# Patient Record
Sex: FEMALE | Race: WHITE | NOT HISPANIC OR LATINO | Employment: FULL TIME | URBAN - METROPOLITAN AREA
[De-identification: names, ages, dates, MRNs, and addresses within clinical notes are randomized per-mention and may not be internally consistent; named-entity substitution may affect disease eponyms.]

---

## 2017-01-18 ENCOUNTER — TRANSCRIBE ORDERS (OUTPATIENT)
Dept: ADMINISTRATIVE | Facility: HOSPITAL | Age: 47
End: 2017-01-18

## 2017-01-18 DIAGNOSIS — Z12.31 VISIT FOR SCREENING MAMMOGRAM: Primary | ICD-10-CM

## 2017-01-24 ENCOUNTER — APPOINTMENT (OUTPATIENT)
Dept: MAMMOGRAPHY | Facility: HOSPITAL | Age: 47
End: 2017-01-24
Attending: OBSTETRICS & GYNECOLOGY

## 2017-01-31 ENCOUNTER — HOSPITAL ENCOUNTER (OUTPATIENT)
Dept: MAMMOGRAPHY | Facility: HOSPITAL | Age: 47
Discharge: HOME | End: 2017-01-31
Attending: OBSTETRICS & GYNECOLOGY | Admitting: OBSTETRICS & GYNECOLOGY

## 2017-01-31 DIAGNOSIS — Z12.31 VISIT FOR SCREENING MAMMOGRAM: ICD-10-CM

## 2017-01-31 PROCEDURE — 77063 BREAST TOMOSYNTHESIS BI: CPT | Performed by: RADIOLOGY

## 2017-01-31 PROCEDURE — G0202 SCR MAMMO BI INCL CAD: HCPCS

## 2017-01-31 PROCEDURE — 77067 SCR MAMMO BI INCL CAD: CPT | Performed by: RADIOLOGY

## 2017-01-31 PROCEDURE — 77063 BREAST TOMOSYNTHESIS BI: CPT

## 2019-07-30 ENCOUNTER — TRANSCRIBE ORDERS (OUTPATIENT)
Dept: ADMINISTRATIVE | Facility: HOSPITAL | Age: 49
End: 2019-07-30

## 2019-07-30 DIAGNOSIS — Z12.31 VISIT FOR SCREENING MAMMOGRAM: Primary | ICD-10-CM

## 2019-08-14 ENCOUNTER — HOSPITAL ENCOUNTER (OUTPATIENT)
Dept: MAMMOGRAPHY | Facility: HOSPITAL | Age: 49
Discharge: HOME | End: 2019-08-14
Admitting: OBSTETRICS & GYNECOLOGY

## 2019-08-14 DIAGNOSIS — Z12.31 VISIT FOR SCREENING MAMMOGRAM: ICD-10-CM

## 2019-08-14 PROCEDURE — 77063 BREAST TOMOSYNTHESIS BI: CPT

## 2019-08-14 PROCEDURE — 77067 SCR MAMMO BI INCL CAD: CPT | Performed by: RADIOLOGY

## 2019-08-14 PROCEDURE — 77063 BREAST TOMOSYNTHESIS BI: CPT | Performed by: RADIOLOGY

## 2019-08-14 PROCEDURE — 77067 SCR MAMMO BI INCL CAD: CPT

## 2020-10-13 ENCOUNTER — TRANSCRIBE ORDERS (OUTPATIENT)
Dept: ADMINISTRATIVE | Facility: HOSPITAL | Age: 50
End: 2020-10-13

## 2020-10-13 DIAGNOSIS — Z12.31 VISIT FOR SCREENING MAMMOGRAM: Primary | ICD-10-CM

## 2020-10-14 ENCOUNTER — OFFICE VISIT (OUTPATIENT)
Dept: OBSTETRICS AND GYNECOLOGY | Facility: CLINIC | Age: 50
End: 2020-10-14

## 2020-10-14 VITALS
DIASTOLIC BLOOD PRESSURE: 80 MMHG | SYSTOLIC BLOOD PRESSURE: 120 MMHG | WEIGHT: 235 LBS | BODY MASS INDEX: 43.24 KG/M2 | TEMPERATURE: 97.5 F | HEIGHT: 62 IN

## 2020-10-14 DIAGNOSIS — L98.8 LESION OF SKIN OF BREAST: Primary | ICD-10-CM

## 2020-10-14 PROCEDURE — 99213 OFFICE O/P EST LOW 20 MIN: CPT | Performed by: NURSE PRACTITIONER

## 2020-10-14 NOTE — PROGRESS NOTES
"Subjective   Chief Complaint   Patient presents with   • Breast Problem     Inga Ambrosio is a 49 y.o. year old  presenting to be seen because of she tried to schedule a mammogram and mentioned that she had a lesion on the left breast.  She noticed a tender area under her left breast one week ago.  It was swollen and tender.  She could not see the area as she has large breasts.  Her  squeezed it and a small amount of pus came out.  It it now smaller and nontender.  She denies other breast problems.      · Last mammogram: 2019  · Last breast MRI: she has never had a MRI   ·   The following portions of the patient's history were reviewed and updated as appropriate:current medications, allergies, past family history, past medical history, past social history and past surgical history    Social History    Tobacco Use      Smoking status: Never Smoker    Review of Systems  Constitutional POS: nothing reported    NEG: anorexia or night sweats   Genitourinary POS: nothing reported    NEG: dysuria or hematuria   Gastointestinal POS: nothing reported    NEG: bloating, change in bowel habits, melena or reflux symptoms   Integument POS: nothing reported    NEG: moles that are changing in size, shape, color or rashes   Breast POS: nothing reported    NEG: persistent breast lump, skin dimpling or nipple discharge        Objective   /80   Temp 97.5 °F (36.4 °C)   Ht 157.5 cm (62\")   Wt 107 kg (235 lb)   Breastfeeding No   BMI 42.98 kg/m²     General:  well developed; well nourished  no acute distress   Breasts:  Examined in upright and supine position  Symmetric without masses or skin dimpling  Nipples normal without inversion, lesions or discharge  There are no palpable axillary nodes   1 cm healing pimple noted to the left to the outer edge of her healed reduction scar; nontender; no drainage or redness         Assessment               Left breast lesion, healing     Plan               D/w pt area " is healing.  No need for antibiotics.  No need for diagnostic mammogram.  Keep clean and dry.  Call prn worsening.    This note was electronically signed.    October 14, 2020

## 2020-11-05 ENCOUNTER — OFFICE VISIT (OUTPATIENT)
Dept: OBSTETRICS AND GYNECOLOGY | Facility: CLINIC | Age: 50
End: 2020-11-05

## 2020-11-05 ENCOUNTER — HOSPITAL ENCOUNTER (OUTPATIENT)
Dept: MAMMOGRAPHY | Facility: HOSPITAL | Age: 50
Discharge: HOME | End: 2020-11-05
Admitting: OBSTETRICS & GYNECOLOGY

## 2020-11-05 VITALS
HEIGHT: 62 IN | TEMPERATURE: 97.3 F | DIASTOLIC BLOOD PRESSURE: 90 MMHG | SYSTOLIC BLOOD PRESSURE: 120 MMHG | BODY MASS INDEX: 43.61 KG/M2 | WEIGHT: 237 LBS

## 2020-11-05 DIAGNOSIS — Z12.31 VISIT FOR SCREENING MAMMOGRAM: ICD-10-CM

## 2020-11-05 DIAGNOSIS — Z00.00 ANNUAL PHYSICAL EXAM: Primary | ICD-10-CM

## 2020-11-05 PROCEDURE — 77063 BREAST TOMOSYNTHESIS BI: CPT | Performed by: RADIOLOGY

## 2020-11-05 PROCEDURE — 99396 PREV VISIT EST AGE 40-64: CPT | Performed by: NURSE PRACTITIONER

## 2020-11-05 PROCEDURE — 77063 BREAST TOMOSYNTHESIS BI: CPT

## 2020-11-05 PROCEDURE — 77067 SCR MAMMO BI INCL CAD: CPT | Performed by: RADIOLOGY

## 2020-11-05 PROCEDURE — 77067 SCR MAMMO BI INCL CAD: CPT

## 2020-11-05 NOTE — PROGRESS NOTES
GYN Annual Exam     CC - Here for annual exam.        [facility]  Inga Ambrosio is a 50 y.o. female, , who presents for annual well woman exam.  She is postmenopausal.  Patient denies vaginal bleeding. ..  Patient reports problems with: none. There were no changes to her medical or surgical history since her last visit.. Partner Status: Marital Status: .  New Partners since last visit: no.    The patient is here for an annual exam. Her last pap smear was  and negative. Her last mammogram was this morning and results are pending. She has not had a colonoscopy. She had had a hysterectomy.  Additional OB/GYN History   Current contraception: contraceptive methods: None    On HRT? No  Last Pap : 2013 negative  Last Completed Pap Smear     Patient has no health maintenance due at this time        History of abnormal Pap smear: no  Family history of uterine, colon, breast, or ovarian cancer: yes - paternal grandmother at 80  Performs monthly Self-Breast Exam: yes  Last mammogram: 2020 results pending  Last Completed Mammogram       Status Date      MAMMOGRAM Done 2019 MAMMO SCREENING DIGITAL TOMOSYNTHESIS BILATERAL W CAD     Patient has more history with this topic...        Last colonoscopy: none  Last Completed Colonoscopy       Status Date      COLONOSCOPY No completions recorded        Last DEXA: None  Exercises Regularly: no  Feelings of Anxiety or Depression: no      Tobacco Usage?: No   OB History        2    Para   2    Term   2            AB        Living           SAB        TAB        Ectopic        Molar        Multiple        Live Births                    Health Maintenance   Topic Date Due   • Annual Gynecologic Pelvic and Breast Exam  1970   • COLONOSCOPY  1969   • ANNUAL PHYSICAL  10/19/1972   • TDAP/TD VACCINES (1 - Tdap) 10/19/1988   • ZOSTER VACCINE (1 of 2) 10/19/2019   • INFLUENZA VACCINE  2020   • HEPATITIS C SCREENING  10/13/2020   •  "MAMMOGRAM  08/14/2021   • Pneumococcal Vaccine 0-64  Aged Out       The additional following portions of the patient's history were reviewed and updated as appropriate: allergies, current medications, past family history, past medical history, past social history, past surgical history and problem list.    Review of Systems   Constitutional: Negative.    Gastrointestinal: Negative.    Genitourinary: Negative.    Psychiatric/Behavioral: Negative.    All other systems reviewed and are negative.    All other systems reviewed and are negative.     I have reviewed and agree with the HPI, ROS, and historical information as entered above. Makayla Viktor, APRN    Objective   /90   Temp 97.3 °F (36.3 °C)   Ht 157.5 cm (62\")   Wt 108 kg (237 lb)   BMI 43.35 kg/m²     Physical Exam  Vitals signs and nursing note reviewed. Exam conducted with a chaperone present.   Constitutional:       Appearance: Normal appearance. She is obese.   Cardiovascular:      Rate and Rhythm: Normal rate and regular rhythm.   Chest:      Breasts:         Right: Normal.         Left: Normal.   Genitourinary:     General: Normal vulva.      Vagina: Normal.      Cervix: Normal.      Uterus: Normal.       Adnexa: Right adnexa normal and left adnexa normal.      Rectum: Normal.   Neurological:      Mental Status: She is alert.            Assessment and Plan    Problem List Items Addressed This Visit     None      Visit Diagnoses     Annual physical exam    -  Primary    Relevant Orders    Pap IG, Rfx HPV ASCU          1. GYN annual well woman exam.   2. Reviewed monthly self breast exams.  Instructed to call with lumps, pain, or breast discharge.  Yearly mammograms ordered.  3. Recommended use of Vitamin D and getting adequate calcium in her diet. (1500mg)  4. Reviewed exercise as a preventative health measures.   5. Colonoscopy recommended.  6. Reccommended Flu Vaccine in Fall of each year.  7. RTC in 1 year or PRN with problems.   8. Patient " had mammogram today. She is going to check with insurance and will call back to get order for a screening colonoscopy if it is covered under her current plan.  9. Patient has had a hysterectomy. She has never had an abnormal pap. Patient advised no more pap smears necessary.      Makayla Hoang, APRN  11/05/2020

## 2020-11-12 DIAGNOSIS — Z00.00 ANNUAL PHYSICAL EXAM: ICD-10-CM

## 2021-11-19 ENCOUNTER — HOSPITAL ENCOUNTER (OUTPATIENT)
Age: 51
End: 2021-11-19
Payer: COMMERCIAL

## 2021-11-19 DIAGNOSIS — Z20.822: Primary | ICD-10-CM

## 2021-11-19 PROCEDURE — U0005 INFEC AGEN DETEC AMPLI PROBE: HCPCS

## 2021-11-19 PROCEDURE — C9803 HOPD COVID-19 SPEC COLLECT: HCPCS

## 2021-11-19 PROCEDURE — U0003 INFECTIOUS AGENT DETECTION BY NUCLEIC ACID (DNA OR RNA); SEVERE ACUTE RESPIRATORY SYNDROME CORONAVIRUS 2 (SARS-COV-2) (CORONAVIRUS DISEASE [COVID-19]), AMPLIFIED PROBE TECHNIQUE, MAKING USE OF HIGH THROUGHPUT TECHNOLOGIES AS DESCRIBED BY CMS-2020-01-R: HCPCS

## 2021-11-24 ENCOUNTER — HOSPITAL ENCOUNTER (OUTPATIENT)
Age: 51
End: 2021-11-24
Payer: COMMERCIAL

## 2021-11-24 DIAGNOSIS — Z20.822: Primary | ICD-10-CM

## 2021-11-24 PROCEDURE — C9803 HOPD COVID-19 SPEC COLLECT: HCPCS

## 2021-11-24 PROCEDURE — U0005 INFEC AGEN DETEC AMPLI PROBE: HCPCS

## 2021-11-24 PROCEDURE — U0003 INFECTIOUS AGENT DETECTION BY NUCLEIC ACID (DNA OR RNA); SEVERE ACUTE RESPIRATORY SYNDROME CORONAVIRUS 2 (SARS-COV-2) (CORONAVIRUS DISEASE [COVID-19]), AMPLIFIED PROBE TECHNIQUE, MAKING USE OF HIGH THROUGHPUT TECHNOLOGIES AS DESCRIBED BY CMS-2020-01-R: HCPCS

## 2022-01-27 ENCOUNTER — HOSPITAL ENCOUNTER (OUTPATIENT)
Age: 52
End: 2022-01-27
Payer: COMMERCIAL

## 2022-01-27 DIAGNOSIS — U07.1: Primary | ICD-10-CM

## 2022-01-27 PROCEDURE — U0003 INFECTIOUS AGENT DETECTION BY NUCLEIC ACID (DNA OR RNA); SEVERE ACUTE RESPIRATORY SYNDROME CORONAVIRUS 2 (SARS-COV-2) (CORONAVIRUS DISEASE [COVID-19]), AMPLIFIED PROBE TECHNIQUE, MAKING USE OF HIGH THROUGHPUT TECHNOLOGIES AS DESCRIBED BY CMS-2020-01-R: HCPCS

## 2022-01-27 PROCEDURE — U0005 INFEC AGEN DETEC AMPLI PROBE: HCPCS

## 2022-01-27 PROCEDURE — C9803 HOPD COVID-19 SPEC COLLECT: HCPCS

## 2022-02-10 ENCOUNTER — HOSPITAL ENCOUNTER (OUTPATIENT)
Age: 52
End: 2022-02-10
Payer: COMMERCIAL

## 2022-02-10 DIAGNOSIS — M25.551: Primary | ICD-10-CM

## 2022-02-10 PROCEDURE — 73502 X-RAY EXAM HIP UNI 2-3 VIEWS: CPT

## 2022-04-22 ENCOUNTER — TRANSCRIBE ORDERS (OUTPATIENT)
Dept: ADMINISTRATIVE | Facility: HOSPITAL | Age: 52
End: 2022-04-22

## 2022-04-22 DIAGNOSIS — Z12.31 VISIT FOR SCREENING MAMMOGRAM: Primary | ICD-10-CM

## 2022-05-12 ENCOUNTER — APPOINTMENT (OUTPATIENT)
Dept: MAMMOGRAPHY | Facility: HOSPITAL | Age: 52
End: 2022-05-12

## 2022-05-26 ENCOUNTER — HOSPITAL ENCOUNTER (OUTPATIENT)
Dept: MAMMOGRAPHY | Facility: HOSPITAL | Age: 52
Discharge: HOME | End: 2022-05-26
Admitting: OBSTETRICS & GYNECOLOGY

## 2022-05-26 DIAGNOSIS — Z12.31 VISIT FOR SCREENING MAMMOGRAM: ICD-10-CM

## 2022-05-26 PROCEDURE — 77067 SCR MAMMO BI INCL CAD: CPT | Performed by: RADIOLOGY

## 2022-05-26 PROCEDURE — 77063 BREAST TOMOSYNTHESIS BI: CPT

## 2022-05-26 PROCEDURE — 77063 BREAST TOMOSYNTHESIS BI: CPT | Performed by: RADIOLOGY

## 2022-05-26 PROCEDURE — 77067 SCR MAMMO BI INCL CAD: CPT

## 2023-12-13 ENCOUNTER — HOSPITAL ENCOUNTER (OUTPATIENT)
Age: 53
End: 2023-12-13
Payer: COMMERCIAL

## 2023-12-13 DIAGNOSIS — M25.521: Primary | ICD-10-CM

## 2023-12-13 PROCEDURE — 73080 X-RAY EXAM OF ELBOW: CPT

## 2024-09-18 ENCOUNTER — HOSPITAL ENCOUNTER (OUTPATIENT)
Dept: HOSPITAL 22 - RAD | Age: 54
Discharge: HOME | End: 2024-09-18
Payer: COMMERCIAL

## 2024-09-18 DIAGNOSIS — M71.9: Primary | ICD-10-CM

## 2024-09-18 DIAGNOSIS — G89.29: ICD-10-CM

## 2024-09-18 PROCEDURE — 73080 X-RAY EXAM OF ELBOW: CPT

## 2024-09-18 PROCEDURE — 72050 X-RAY EXAM NECK SPINE 4/5VWS: CPT

## 2024-09-18 NOTE — XR_ITS
FINAL REPORT 
 
CLINICAL HISTORY: 
BURSITIS 
 
COMPARISON: 
12/13/2023 
 
FINDINGS: 
RIGHT ELBOW  Three views of the right elbow were obtained. 
There is no acute fracture or dislocation.  The joint spaces are 
well-preserved.  No joint effusion is identified.  There is no 
acute soft tissue abnormality. 
 
IMPRESSION: 
No acute abnormality identified. 
 
Reviewed, Interpreted and Dictated by Jerald Gracia MD 
Transcribed by Nasrin Monae 
 
Authenticated and Electronically Signed by Jerald Gracia MD 
on 09/18/2024 01:43:12 PM [facility]

## 2024-09-18 NOTE — XR_ITS
FINAL REPORT 
 
CLINICAL HISTORY: 
CHRONIC PAIN 
 
FINDINGS: 
CERVICAL SPINE  Five views demonstrate no acute fracture.  There 
is moderate disc space narrowing at C6-7 with small anterior 
osteophytes.  The neural foramina are adequately patent.  There 
is no malalignment. 
 
IMPRESSION: 
Degenerative change as above. 
 
Reviewed, Interpreted and Dictated by Jerald Gracia MD 
Transcribed by Thea Gomez 
 
Authenticated and Electronically Signed by Jerald Gracia MD 
on 09/18/2024 01:43:09 PM [facility]

## 2024-11-22 ENCOUNTER — HOSPITAL ENCOUNTER (OUTPATIENT)
Dept: HOSPITAL 22 - RAD | Age: 54
Discharge: HOME | End: 2024-11-22
Payer: COMMERCIAL

## 2024-11-22 DIAGNOSIS — M54.2: Primary | ICD-10-CM

## 2024-11-22 PROCEDURE — 72125 CT NECK SPINE W/O DYE: CPT

## 2025-01-15 ENCOUNTER — HOSPITAL ENCOUNTER (OUTPATIENT)
Dept: HOSPITAL 22 - RAD | Age: 55
Discharge: HOME | End: 2025-01-15
Payer: COMMERCIAL

## 2025-01-15 DIAGNOSIS — Z12.31: Primary | ICD-10-CM

## 2025-01-15 DIAGNOSIS — N95.9: ICD-10-CM

## 2025-01-15 PROCEDURE — 77063 BREAST TOMOSYNTHESIS BI: CPT

## 2025-01-15 PROCEDURE — 77080 DXA BONE DENSITY AXIAL: CPT

## 2025-01-15 PROCEDURE — 77067 SCR MAMMO BI INCL CAD: CPT

## 2025-01-15 NOTE — MM_ITS
PROCEDURE INFORMATION:  
Exam:  Bilateral Screening 3D Mammography  
Exam date and time: 1/15/2025 2:38 PM  
Age: 54 years old  
Clinical indication: Screening exam.  
 
TECHNIQUE:  
Imaging protocol: Bilateral Screening tomosynthesis and 2D  
mammography  
including computer-aided detection (CAD) when performed.  
 
COMPARISON:  
1.   [facility] DMSB DIGITAL MAMM-SCREEN BILATERAL 8/24/2010 5:08 PM  
2.   [facility] DIGMAMMS MAMMOGRAM SCREEN- N/C 8/17/2009 7:21 AM  
 
FINDINGS:  
 
MAMMOGRAPHY:  
Breast composition: There are scattered areas of fibroglandular  
density.  
Mass: No suspicious masses.  
Architectural distortion: None.  
Calcifications: No suspicious calcifications.  
Asymmetric density: None.  
Skin thickening: None.  
Axillary adenopathy: None.  
 
IMPRESSION:  
No mammographic evidence of malignancy. Annual screening is  
recommended unless  
otherwise clinically indicated.  
 
ASSESSMENT:  
BI-RADS Category 1: Negative.  
 
Electronically signed by Trevin Davis DO at 01/17/2025 09:23

## 2025-01-15 NOTE — XR_ITS
FINAL REPORT 
 
TECHNIQUE: 
Bone densitometry calculations of the lumbar spine and left hip 
were obtained. 
 
CLINICAL HISTORY: 
Screening Dexa Scan 
 
COMPARISON: 
None 
 
FINDINGS: 
Using L1-4, the bone mineral density of the spine is 1.123 
g/cm2, corresponding to T-score of 0.7.  Using the left hip, the 
bone mineral density of the femoral neck is 0.979 g/cm2, 
corresponding to a T-score of 1.2.  NOTE:   T-score:  Standard 
deviation compared with peak bone mass of young adult mean. 
*Following the recommendations of the International Society of 
Bone densitometry, classification of hip BMD is based on the 
lower of two T-scores; total hip or femoral neck. 
 
IMPRESSION: 
Normal bone mineral density of the lumbar spine and hip. 
 
Reviewed, Interpreted and Dictated by MARY Kim MD 
Transcribed by Heidi Romero 
 
Authenticated and Electronically Signed by MARY Kim MD on 
01/16/2025 08:04:26 AM [facility]

## 2025-02-19 ENCOUNTER — HOSPITAL ENCOUNTER (OUTPATIENT)
Age: 55
Discharge: HOME | End: 2025-02-19
Payer: COMMERCIAL

## 2025-02-19 VITALS
OXYGEN SATURATION: 97 % | RESPIRATION RATE: 16 BRPM | HEART RATE: 61 BPM | SYSTOLIC BLOOD PRESSURE: 134 MMHG | DIASTOLIC BLOOD PRESSURE: 78 MMHG

## 2025-02-19 VITALS
OXYGEN SATURATION: 94 % | RESPIRATION RATE: 15 BRPM | SYSTOLIC BLOOD PRESSURE: 122 MMHG | HEART RATE: 62 BPM | DIASTOLIC BLOOD PRESSURE: 79 MMHG

## 2025-02-19 VITALS
HEART RATE: 63 BPM | SYSTOLIC BLOOD PRESSURE: 130 MMHG | DIASTOLIC BLOOD PRESSURE: 82 MMHG | TEMPERATURE: 98 F | RESPIRATION RATE: 16 BRPM | OXYGEN SATURATION: 99 %

## 2025-02-19 VITALS
HEART RATE: 60 BPM | SYSTOLIC BLOOD PRESSURE: 117 MMHG | OXYGEN SATURATION: 93 % | TEMPERATURE: 97.7 F | DIASTOLIC BLOOD PRESSURE: 74 MMHG | RESPIRATION RATE: 15 BRPM

## 2025-02-19 VITALS
OXYGEN SATURATION: 99 % | RESPIRATION RATE: 18 BRPM | SYSTOLIC BLOOD PRESSURE: 141 MMHG | HEART RATE: 69 BPM | TEMPERATURE: 98.06 F | DIASTOLIC BLOOD PRESSURE: 91 MMHG

## 2025-02-19 VITALS — OXYGEN SATURATION: 97 %

## 2025-02-19 VITALS — BODY MASS INDEX: 42 KG/M2

## 2025-02-19 DIAGNOSIS — Z12.11: Primary | ICD-10-CM

## 2025-02-19 DIAGNOSIS — K63.5: ICD-10-CM

## 2025-02-19 DIAGNOSIS — K64.8: ICD-10-CM

## 2025-02-19 PROCEDURE — 45385 COLONOSCOPY W/LESION REMOVAL: CPT

## 2025-02-19 PROCEDURE — 0DJD8ZZ INSPECTION OF LOWER INTESTINAL TRACT, VIA NATURAL OR ARTIFICIAL OPENING ENDOSCOPIC: ICD-10-PCS | Performed by: INTERNAL MEDICINE

## 2025-02-19 NOTE — P.HP_ITS
History of Present Illness    
*Admission Date: 02/19/25    
*Reason for visit:: Screening for colon cancer    
*History of present illness:     
Mrs. Coker is a 54-year-old female who is here for initial screening   
colonoscopy.  The examination is deemed medically necessary for screening   
colonoscopy.  The patient has been seen, interviewed and examined prior to the   
procedure by both myself and the anesthesia provider.    
    
[facility]    
Disclaimer:     
The information contained in this section may have been updated after the   
patient was seen, as this information can be updated by other users.    
    
Medical History (Updated 02/19/25 @ 09:31 by Flex Busch II, MD)    
    
Fluid level behind tympanic membrane of right ear    
Hearing loss    
Hearing difficulty of both ears    
Tinnitus of both ears    
    
    
Surgical History (Reviewed 02/19/25 @ 08:40 by Ml Mantilla RN)    
    
History of elbow surgery    
History of total hysterectomy    
History of bilateral breast reduction surgery    
    
    
Family History (Reviewed 02/19/25 @ 08:40 by Ml Mantilla RN)    
Other   No significant family history    
    
    
    
Social History (Updated 02/19/25 @ 09:27 by Lucian Moore CRNA)    
Smoking Status:  Former smoker     
alcohol intake:  never     
substance use type:  denies use     
current occupational status:  employed     
Travel in the last 8 weeks:  None     
caffeine:  Yes     
Have you lived/traveled outside US in past 30 days?:  No     
Contact w/someone who lives/traveled outside US past 30 days?:  No     
Exposure to someone with infectious disease in past 14 days?:  No     
Do you have a fever (greater than 100.4 F or 38 C)?:  No     
Have you tested positive for COVID-19:  Yes     
Exposed to someone with COVID-19 in past 14 days?:  No     
Do you have a sore throat?:  No     
Do you have a cough?:  No     
Do you have any weakness?:  No     
Are you experiencing any nausea/vomitting?:  No     
Do you have any diarrhea?:  No     
Are you experiencing any unusual bleeding?:  No     
Do you have any muscle aches/pain?:  No     
Do you have any abdominal pain?:  No     
Are you experiencing loss of taste or smell?:  No     
    
    
Other Medical History    
Have you received the Pneumonia Vaccine: No    
    
Review of Systems    
Review of Systems    
Review of systems (narrative):     
Negative    
*Cardiovascular    
Comments:     
Negative    
*Gastrointestinal    
Comments:     
Negative    
*Genitourinary    
Comments:     
Negative    
*Musculoskeletal    
Comments:     
Negative    
*Neurologic    
Comments:     
Negative    
    
Meds    
Home Medications and Allergies    
                                Home Medications    
    
    
    
?Medication ?Instructions ?Recorded ?Confirmed ?Type    
     
sodium,potassium,mag sulfates 17.5 See Rx Instructions PO .COMPLEX 02/07/25  Rx    
    
gram-3.13 gram-1.6 gram oral soln #354 mL       
    
(Suprep Bowel Prep Kit)        
    
    
                           New Prescriptions to Start    
    
Prescriptions:      
    
    
                                    Allergies    
    
    
    
Allergy/AdvReac Type Severity Reaction Status Date / Time    
     
clarithromycin (From Biaxin) AdvReac Mild Rash Verified 02/19/25 08:32    
    
    
    
    
Exam    
Data for Last 24 hours    
Vital signs and Labs for Last 24 Hours:     
    
                                            
    
    
    
Temp Pulse Resp BP Pulse Ox O2 Del Method O2 Flow Rate    
     
 98.0 F   69   18   141/91 H  99   Nasal Cannula  5     
     
 02/19/25 08:34  02/19/25 08:34  02/19/25 08:34  02/19/25 08:34  02/19/25 08:34   
02/19/25 09:23  02/19/25 09:23    
    
    
    
    
I & O for Last 24 hours:     
    
                                 Intake & Output    
    
    
    
 02/16/25 02/17/25 02/18/25 02/19/25    
    
 23:59 23:59 23:59 23:59    
     
Weight  230 lb      
    
    
    
    
*Routine HEENT Exam    
Head: Present normocephalic    
Eye: Present EOMI and PERRL    
ENT: Present mucous membranes moist    
*Routine Neck Exam    
Neck: Present supple    
*Routine Respiratory Exam    
Respiratory: Present CTA bilaterally    
*Routine Cardiovascular Exam    
Cardiovascular: Present RRR    
*Routine Abdominal Exam    
Abdominal: Present soft and normoactive bowel sounds; Absent tenderness    
*Routine Rectal Exam    
Rectal:: deferred    
*Routine Genitalia Exam    
Genitalia:: deferred    
*Routine Extremities Exam    
Extremities: Absent cyanosis, clubbing or edema    
*Routine Skin Exam    
Skin: Present warm; Absent rash    
*Routine Neurological Exam    
Neurological: Present alert and oriented X3    
    
Assessment and Plan    
*Assessment and plan    
(1) Screening for colon cancer:     
      Status: Acute    
      Category: Medical    
      Code(s):    
Z12.11 - Encounter for screening for malignant neoplasm of colon    
    
Plan    
A/P: 1.  Screening for colon cancer is the preprocedural diagnosis.  The patient  
will be anesthetized/sedated using MAC sedation.  The patient has been seen and   
examined.  Cardiac and lung assessment prior to the examination is stable.    
Proceed with planned screening colonoscopy

## 2025-02-19 NOTE — EXP.HP
History of Present Illness
*Admission Date: 02/19/25
*Reason for visit:: Screening for colon cancer
*History of present illness: 
Mrs. Coker is a 54-year-old female who is here for initial screening colonoscopy.  The examination is deemed medically necessary for screening colonoscopy.  The patient has been seen, interviewed and examined prior to the procedure by both myself 
and the anesthesia provider.

[facility]
Disclaimer: 
The information contained in this section may have been updated after the patient was seen, as this information can be updated by other users.

Medical History (Updated 02/19/25 @ 09:31 by Flex Busch II, MD)

Fluid level behind tympanic membrane of right ear
Hearing loss
Hearing difficulty of both ears
Tinnitus of both ears


Surgical History (Reviewed 02/19/25 @ 08:40 by Ml Mantilla RN)

History of elbow surgery
History of total hysterectomy
History of bilateral breast reduction surgery


Family History (Reviewed 02/19/25 @ 08:40 by Ml Mantilla RN)
Other
 No significant family history



Social History (Updated 02/19/25 @ 09:27 by Lucian Moore CRNA)
Smoking Status:  Former smoker 
alcohol intake:  never 
substance use type:  denies use 
current occupational status:  employed 
Travel in the last 8 weeks:  None 
caffeine:  Yes 
Have you lived/traveled outside US in past 30 days?:  No 
Contact w/someone who lives/traveled outside US past 30 days?:  No 
Exposure to someone with infectious disease in past 14 days?:  No 
Do you have a fever (greater than 100.4 F or 38 C)?:  No 
Have you tested positive for COVID-19:  Yes 
Exposed to someone with COVID-19 in past 14 days?:  No 
Do you have a sore throat?:  No 
Do you have a cough?:  No 
Do you have any weakness?:  No 
Are you experiencing any nausea/vomitting?:  No 
Do you have any diarrhea?:  No 
Are you experiencing any unusual bleeding?:  No 
Do you have any muscle aches/pain?:  No 
Do you have any abdominal pain?:  No 
Are you experiencing loss of taste or smell?:  No 


Other Medical History
Have you received the Pneumonia Vaccine: No

Review of Systems
Review of Systems
Review of systems (narrative): 
Negative
*Cardiovascular
Comments: 
Negative
*Gastrointestinal
Comments: 
Negative
*Genitourinary
Comments: 
Negative
*Musculoskeletal
Comments: 
Negative
*Neurologic
Comments: 
Negative

Meds
Home Medications and Allergies
Home Medications

?Medication ?Instructions ?Recorded ?Confirmed ?Type
sodium,potassium,mag sulfates 17.5 See Rx Instructions PO .COMPLEX 02/07/25  Rx
gram-3.13 gram-1.6 gram oral soln #354 mL   
(Suprep Bowel Prep Kit)    

New Prescriptions to Start

Prescriptions:  


Allergies

Allergy/AdvReac Type Severity Reaction Status Date / Time
clarithromycin (From Biaxin) AdvReac Mild Rash Verified 02/19/25 08:32



Exam
Data for Last 24 hours
Vital signs and Labs for Last 24 Hours: 



Temp Pulse Resp BP Pulse Ox O2 Del Method O2 Flow Rate
 98.0 F   69   18   141/91 H  99   Nasal Cannula  5 
 02/19/25 08:34  02/19/25 08:34  02/19/25 08:34  02/19/25 08:34  02/19/25 08:34  02/19/25 09:23  02/19/25 09:23



I & O for Last 24 hours: 

Intake & Output

 02/16/25 02/17/25 02/18/25 02/19/25
 23:59 23:59 23:59 23:59
Weight  230 lb  



*Routine HEENT Exam
Head: Present normocephalic
Eye: Present EOMI and PERRL
ENT: Present mucous membranes moist
*Routine Neck Exam
Neck: Present supple
*Routine Respiratory Exam
Respiratory: Present CTA bilaterally
*Routine Cardiovascular Exam
Cardiovascular: Present RRR
*Routine Abdominal Exam
Abdominal: Present soft and normoactive bowel sounds; Absent tenderness
*Routine Rectal Exam
Rectal:: deferred
*Routine Genitalia Exam
Genitalia:: deferred
*Routine Extremities Exam
Extremities: Absent cyanosis, clubbing or edema
*Routine Skin Exam
Skin: Present warm; Absent rash
*Routine Neurological Exam
Neurological: Present alert and oriented X3

Assessment and Plan
*Assessment and plan
(1) Screening for colon cancer: 
      Status: Acute
      Category: Medical
      Code(s):
Z12.11 - Encounter for screening for malignant neoplasm of colon

Plan
A/P: 1.  Screening for colon cancer is the preprocedural diagnosis.  The patient will be anesthetized/sedated using MAC sedation.  The patient has been seen and examined.  Cardiac and lung assessment prior to the examination is stable.  Proceed with 
planned screening colonoscopy

## 2025-02-19 NOTE — EXP.ANES.CKL
[facility]
Disclaimer: 
The information contained in this section may have been updated after the patient was seen, as this information can be updated by other users.

Medical History (Reviewed 25 @ 08:40 by Ml Mantilla RN)

Fluid level behind tympanic membrane of right ear
Hearing loss
Hearing difficulty of both ears
Tinnitus of both ears


Surgical History (Reviewed 25 @ 08:40 by Ml Mantilla RN)

History of elbow surgery
History of total hysterectomy
History of bilateral breast reduction surgery


Family History (Reviewed 25 @ 08:40 by Ml Mantilla RN)
Other
 No significant family history



Social History (Updated 25 @ 08:41 by Ml Mantilla RN)
Smoking Status:  Former smoker 
alcohol intake:  never 
substance use type:  denies use 
current occupational status:  employed 
Travel in the last 8 weeks:  None 
caffeine:  Yes 



[facility] Anesthesia Checklist
Patient Identification
Patient Identification: Verbal (Name & )
Structural Data
Admitted From: Home
Planned Operative Procedure/s: colonoscopy
Consent for Planned Operative Procedure(s) Verified: Yes
Airway Assessment
Mallampati Score:: Class II
C-Spine Mobility Assessed: Yes
TMJ Mobility Assessed: Yes
Dentition: Good Dentition
Neurological Assessment
Level of Consciousness: Awake, Alert and Appropriate
Anesthesia Plan
Anesthesia Risk discussed: Yes
Anesthesia Plan: Verified
ASA Class: II
Anesthesia Type: MAC

## 2025-02-19 NOTE — P.PCN_ITS
[facility] Procedure Note    
Date: 02/19/25    
Time: 09:43    
Procedure Note::     
Colonoscopy Procedure Report: Colonoscopy with cold snare polypectomy    
    
Endoscopist: Flex Busch II, MD    
    
Referring physician: Neela Snowden DO/KEITH Madden MD    
    
Date of Procedure: February 19, 2025    
    
Equipment: Olympus 190 variable stiffness pediatric colonoscope    
    
Sedation: MAC sedation    
    
Indication: Mrs. Coker is a 54-year-old female who is here for initial   
screening colonoscopy.  She reports no abdominal pain, weight loss, change in   
her bowel habits or rectal bleeding.  She reports no family history of colon   
cancer.  She did have a negative Cologuard test 2 years ago.    
    
Procedure:     
Prior to the procedure, a history and physical exam was performed, and patient's  
medications and allergies were reviewed.  The risks, benefits and alternatives   
of the sedation and procedure were discussed with the patient.  All questions   
were answered and informed consent was obtained.  The patient was brought to the  
procedure room.  Patient identification and proposed procedure were verified by   
the physician and the nurse.  The patient was placed in a left lateral decubitus  
position and the scope was passed under direct vision.  Throughout the   
procedure, the patient's blood pressure, pulse, and oxygen saturations were   
monitored continuously.  The colonoscopy was accomplished without difficulty.    
The patient tolerated the procedure well.    
    
Findings: On digital rectal examination there was normal rectal tone. There were  
no external hemorrhoids. The colonoscope was introduced through the anal canal   
to the rectum and advanced to the cecum. The ileocecal valve and appendiceal   
orifice were identified. The scope was advanced a short distance into the ileum   
which appeared grossly normal. The scope was then withdrawn into the colon. The   
cecum, ascending, transverse, descending, sigmoid colon were grossly normal.    
There were 2 diminutive polyps (3 and 4 mm) in the rectosigmoid removed via cold  
snare polypectomy.  There were no other mucosal abnormalities identified. Upon   
retroflexion within the rectum there were grade 1-2 internal hemorrhoids.  The   
preparation was excellent throughout with [place] Preparation Score of 9.  The   
cecal time was 11 minutes.    
    
Impression:     
1.  Diminutive rectosigmoid polyps x 2    
    
Plan:    
I will follow-up the polyp histology and recommend repeat surveillance   
colonoscopy again in 7 to 10 years based upon the pathology.

## 2025-02-19 NOTE — [facility].PROCNOTE
[facility] Procedure Note
Date: 02/19/25
Time: 09:43
Procedure Note:: 
Colonoscopy Procedure Report: Colonoscopy with cold snare polypectomy

Endoscopist: Flex Busch II, MD

Referring physician: Neela Snowden DO/KEITH Madden MD

Date of Procedure: February 19, 2025

Equipment: Olympus 190 variable stiffness pediatric colonoscope

Sedation: MAC sedation

Indication: Mrs. Coker is a 54-year-old female who is here for initial screening colonoscopy.  She reports no abdominal pain, weight loss, change in her bowel habits or rectal bleeding.  She reports no family history of colon cancer.  She did 
have a negative Cologuard test 2 years ago.

Procedure: 
Prior to the procedure, a history and physical exam was performed, and patient's medications and allergies were reviewed.  The risks, benefits and alternatives of the sedation and procedure were discussed with the patient.  All questions were 
answered and informed consent was obtained.  The patient was brought to the procedure room.  Patient identification and proposed procedure were verified by the physician and the nurse.  The patient was placed in a left lateral decubitus position and 
the scope was passed under direct vision.  Throughout the procedure, the patient's blood pressure, pulse, and oxygen saturations were monitored continuously.  The colonoscopy was accomplished without difficulty.  The patient tolerated the procedure 
well.

Findings: On digital rectal examination there was normal rectal tone. There were no external hemorrhoids. The colonoscope was introduced through the anal canal to the rectum and advanced to the cecum. The ileocecal valve and appendiceal orifice were 
identified. The scope was advanced a short distance into the ileum which appeared grossly normal. The scope was then withdrawn into the colon. The cecum, ascending, transverse, descending, sigmoid colon were grossly normal.  There were 2 diminutive 
polyps (3 and 4 mm) in the rectosigmoid removed via cold snare polypectomy.  There were no other mucosal abnormalities identified. Upon retroflexion within the rectum there were grade 1-2 internal hemorrhoids.  The preparation was excellent 
throughout with [place] Preparation Score of 9.  The cecal time was 11 minutes.

Impression: 
1.  Diminutive rectosigmoid polyps x 2

Plan:
I will follow-up the polyp histology and recommend repeat surveillance colonoscopy again in 7 to 10 years based upon the pathology.

## 2025-02-19 NOTE — P.PNANES_ITS
[facility]    
Disclaimer:     
The information contained in this section may have been updated after the   
patient was seen, as this information can be updated by other users.    
    
Medical History (Reviewed 25 @ 08:40 by Ml Mantilla RN)    
    
Fluid level behind tympanic membrane of right ear    
Hearing loss    
Hearing difficulty of both ears    
Tinnitus of both ears    
    
    
Surgical History (Reviewed 25 @ 08:40 by Ml Mantilla RN)    
    
History of elbow surgery    
History of total hysterectomy    
History of bilateral breast reduction surgery    
    
    
Family History (Reviewed 25 @ 08:40 by Ml Mantilla RN)    
Other   No significant family history    
    
    
    
Social History (Updated 25 @ 08:41 by Ml Mantilla RN)    
Smoking Status:  Former smoker     
alcohol intake:  never     
substance use type:  denies use     
current occupational status:  employed     
Travel in the last 8 weeks:  None     
caffeine:  Yes     
    
    
    
[facility] Anesthesia Checklist    
Patient Identification    
Patient Identification: Verbal (Name & )    
Structural Data    
Admitted From: Home    
Planned Operative Procedure/s: colonoscopy    
Consent for Planned Operative Procedure(s) Verified: Yes    
Airway Assessment    
Mallampati Score:: Class II    
C-Spine Mobility Assessed: Yes    
TMJ Mobility Assessed: Yes    
Dentition: Good Dentition    
Neurological Assessment    
Level of Consciousness: Awake, Alert and Appropriate    
Anesthesia Plan    
Anesthesia Risk discussed: Yes    
Anesthesia Plan: Verified    
ASA Class: II    
Anesthesia Type: MAC

## 2025-07-29 ENCOUNTER — HOSPITAL ENCOUNTER (OUTPATIENT)
Dept: HOSPITAL 22 - RAD | Age: 55
Discharge: HOME | End: 2025-07-29
Payer: COMMERCIAL

## 2025-07-29 DIAGNOSIS — N63.25: Primary | ICD-10-CM

## 2025-07-29 PROCEDURE — 76641 ULTRASOUND BREAST COMPLETE: CPT
